# Patient Record
Sex: FEMALE | Race: WHITE | NOT HISPANIC OR LATINO | Employment: OTHER | URBAN - METROPOLITAN AREA
[De-identification: names, ages, dates, MRNs, and addresses within clinical notes are randomized per-mention and may not be internally consistent; named-entity substitution may affect disease eponyms.]

---

## 2019-03-04 PROCEDURE — 88305 TISSUE EXAM BY PATHOLOGIST: CPT | Performed by: PATHOLOGY

## 2019-03-06 ENCOUNTER — LAB REQUISITION (OUTPATIENT)
Dept: LAB | Facility: HOSPITAL | Age: 64
End: 2019-03-06
Payer: MEDICARE

## 2019-03-06 DIAGNOSIS — C44.91 BASAL CELL CARCINOMA OF SKIN: ICD-10-CM

## 2020-01-30 ENCOUNTER — TRANSCRIBE ORDERS (OUTPATIENT)
Dept: ADMINISTRATIVE | Facility: HOSPITAL | Age: 65
End: 2020-01-30

## 2020-01-30 DIAGNOSIS — F17.200 TOBACCO USE DISORDER: Primary | ICD-10-CM

## 2020-09-02 ENCOUNTER — HOSPITAL ENCOUNTER (OUTPATIENT)
Dept: CT IMAGING | Facility: HOSPITAL | Age: 65
Discharge: HOME | End: 2020-09-02
Payer: COMMERCIAL

## 2020-09-02 DIAGNOSIS — F17.200 TOBACCO USE DISORDER: ICD-10-CM

## 2020-09-02 PROCEDURE — G0297 LDCT FOR LUNG CA SCREEN: HCPCS

## 2020-09-25 ENCOUNTER — TRANSCRIBE ORDERS (OUTPATIENT)
Dept: ADMINISTRATIVE | Facility: HOSPITAL | Age: 65
End: 2020-09-25

## 2020-09-25 DIAGNOSIS — R91.1 SOLITARY PULMONARY NODULE: Primary | ICD-10-CM

## 2021-01-27 ENCOUNTER — TRANSCRIBE ORDERS (OUTPATIENT)
Dept: ADMINISTRATIVE | Facility: HOSPITAL | Age: 66
End: 2021-01-27

## 2021-01-27 DIAGNOSIS — R91.8 ABNORMAL CT LUNG SCREENING: Primary | ICD-10-CM

## 2021-03-10 DIAGNOSIS — Z23 ENCOUNTER FOR IMMUNIZATION: ICD-10-CM

## 2021-11-17 ENCOUNTER — HOSPITAL ENCOUNTER (OUTPATIENT)
Dept: CT IMAGING | Facility: HOSPITAL | Age: 66
Discharge: HOME | End: 2021-11-17
Payer: MEDICARE

## 2021-11-17 DIAGNOSIS — R91.8 ABNORMAL CT LUNG SCREENING: ICD-10-CM

## 2021-11-17 PROCEDURE — 71271 CT THORAX LUNG CANCER SCR C-: CPT

## 2022-05-12 ENCOUNTER — CONSULT (OUTPATIENT)
Dept: PAIN MEDICINE | Facility: MEDICAL CENTER | Age: 67
End: 2022-05-12
Payer: MEDICARE

## 2022-05-12 VITALS
HEART RATE: 84 BPM | BODY MASS INDEX: 31.54 KG/M2 | WEIGHT: 178 LBS | HEIGHT: 63 IN | DIASTOLIC BLOOD PRESSURE: 70 MMHG | TEMPERATURE: 98.1 F | OXYGEN SATURATION: 97 % | SYSTOLIC BLOOD PRESSURE: 142 MMHG

## 2022-05-12 DIAGNOSIS — M46.1 SACROILIITIS (HCC): Primary | ICD-10-CM

## 2022-05-12 PROCEDURE — 99204 OFFICE O/P NEW MOD 45 MIN: CPT | Performed by: PHYSICAL MEDICINE & REHABILITATION

## 2022-05-12 RX ORDER — LISINOPRIL 2.5 MG/1
TABLET ORAL
COMMUNITY
Start: 2022-05-06

## 2022-05-12 RX ORDER — TRAMADOL HYDROCHLORIDE 50 MG/1
TABLET ORAL
COMMUNITY
Start: 2022-02-28

## 2022-05-12 RX ORDER — ESCITALOPRAM OXALATE 10 MG/1
10 TABLET ORAL DAILY
COMMUNITY
Start: 2022-03-18

## 2022-05-12 RX ORDER — ROSUVASTATIN CALCIUM 5 MG/1
TABLET, COATED ORAL DAILY
COMMUNITY

## 2022-05-12 NOTE — PATIENT INSTRUCTIONS
Sacroiliitis   WHAT YOU NEED TO KNOW:   Sacroiliitis is a painful swelling of one or both of your sacroiliac joints that lasts at least 3 months  The sacroiliac joint connects your pelvis to the base of your spine  DISCHARGE INSTRUCTIONS:   Medicines:  Ask for more information about these and other medicines you may need to treat sacroiliitis:  Pain medicine: You may be given medicine to take away or decrease pain  Do not wait until the pain is severe before you take your medicine  You may be given the medicine as a pill to swallow or as a lotion that you put on the painful area  NSAIDs  help decrease swelling and pain or fever  This medicine is available with or without a doctor's order  NSAIDs can cause stomach bleeding or kidney problems in certain people  If you take blood thinner medicine, always ask your healthcare provider if NSAIDs are safe for you  Always read the medicine label and follow directions  Muscle relaxers  help decrease pain and muscle spasms  Take your medicine as directed  Contact your healthcare provider if you think your medicine is not helping or if you have side effects  Tell him of her if you are allergic to any medicine  Keep a list of the medicines, vitamins, and herbs you take  Include the amounts, and when and why you take them  Bring the list or the pill bottles to follow-up visits  Carry your medicine list with you in case of an emergency  Physical therapy:  Your healthcare provider may suggest physical therapy  Your physical therapist may teach you exercises to improve posture (the way you stand and sit), flexibility, and strength in your lower back  He may also teach you how to remain safely active and avoid further injury  Follow the exercise plan given to you by your physical therapist   Rest:  Follow your healthcare provider's instructions about how much rest you should get  Avoid activity that worsens your pain    Ice or heat packs:  Use ice or heat packs on the sore area of your body to decrease the pain and swelling  Put ice in a plastic bag covered with a towel on your low back  Cover heated items with a towel to avoid burns  Use ice and heat as directed  Follow up with your healthcare provider or spine specialist within 1 to 2 weeks:  Write down your questions so you remember to ask them during your visits  Contact your healthcare provider or spine specialist if:   Your pain makes it hard for you to do your daily activities, such as work or school  Your pain does not go away after treatment  You feel depressed or anxious  You have questions about your condition or care  Return to the emergency department if:   You have a fever  Your pain is worse than before  Your pain prevents you from sleeping  © Copyright [employer] 2022 Information is for End User's use only and may not be sold, redistributed or otherwise used for commercial purposes  All illustrations and images included in CareNotes® are the copyrighted property of [employer]  or Phil Santiago   The above information is an  only  It is not intended as medical advice for individual conditions or treatments  Talk to your doctor, nurse or pharmacist before following any medical regimen to see if it is safe and effective for you

## 2022-05-12 NOTE — PROGRESS NOTES
Assessment  1  Sacroiliitis ([facility] [address] ) - Right        Plan  1  At this time we will schedule patient for a right sacroiliac joint injection under fluoroscopic guidance  Complete risks and benefits including bleeding, infection, tissue reaction, allergic reaction were discussed  Verbal consent obtained  My impressions and treatment recommendations were discussed in detail with the patient who verbalized understanding and had no further questions  Discharge instructions were provided  I personally saw and examined the patient and I agree with the above discussed plan of care  Orders Placed This Encounter   Procedures    [place] spine and pain procedure     Standing Status:   Future     Standing Expiration Date:   5/12/2023     Order Specific Question:   Reason for Exam:     Answer:   (R) SIJ injection     Order Specific Question:   Anticoagulant hold needed? Answer:   no     New Medications Ordered This Visit   Medications    escitalopram (LEXAPRO) 10 mg tablet     Sig: Take 10 mg by mouth in the morning   lisinopril (ZESTRIL) 2 5 mg tablet    rosuvastatin (CRESTOR) 5 mg tablet     Sig: Take by mouth Daily    traMADol (ULTRAM) 50 mg tablet     Sig: PLEASE SEE ATTACHED FOR DETAILED DIRECTIONS       History of Present Illness    Deborah Canales is a 74 y o  female seen in consultation regarding transfer of care from her previous pain physician, Dr Josh Valladares  She had been seeing him for number of years with response to interventional approaches and medication management  She is currently describing pain in the right sacroiliac region which is moderate in intensity in rated as a 5/10  This is nearly constant without any typical pattern characterized as shooting, sharp, dull, aching  She denies any radicular features at this time and does not require an assistive device for ambulation  Aggravating factors include exercise  Alleviating factors include relaxation      Diagnostic studies include MRI of the lumbar spine at outside institution I do not have these images to review but the report is available  She has noted excellent relief in the past with the injections moderate relief with physical therapy and exercise as well as local heat or ice application and a 81Y unit  Social history positive for tobacco and rare alcohol consumption negative for marijuana use  Previously using tramadol for pain relief  I have personally reviewed and/or updated the patient's past medical history, past surgical history, family history, social history, current medications, allergies, and vital signs today  Review of Systems   Constitutional: Negative for fever and unexpected weight change  HENT: Negative for trouble swallowing  Eyes: Negative for visual disturbance  Respiratory: Positive for cough  Negative for shortness of breath and wheezing  Cardiovascular: Negative for chest pain and palpitations  Gastrointestinal: Negative for constipation, diarrhea, nausea and vomiting  Endocrine: Negative for cold intolerance, heat intolerance and polydipsia  Genitourinary: Negative for difficulty urinating and frequency  Musculoskeletal: Positive for back pain and gait problem  Negative for arthralgias, joint swelling and myalgias  Skin: Negative for rash  Neurological: Negative for dizziness, seizures, syncope, weakness and headaches  Hematological: Does not bruise/bleed easily  Psychiatric/Behavioral: Negative for dysphoric mood  The patient is nervous/anxious  All other systems reviewed and are negative  There is no problem list on file for this patient        Past Medical History:   Diagnosis Date    Anxiety     Arthritis     Diabetes mellitus ([address] )     Hyperlipidemia        Past Surgical History:   Procedure Laterality Date    EPIDURAL BLOCK INJECTION      FRACTURE SURGERY      ORTHOPEDIC SURGERY         Family History   Problem Relation Age of Onset    No Known Problems Mother  No Known Problems Father        Social History     Occupational History    Not on file   Tobacco Use    Smoking status: Current Every Day Smoker     Packs/day: 0 50     Years: 25 00     Pack years: 12 50     Types: Cigarettes    Smokeless tobacco: Former User   Vaping Use    Vaping Use: Never used   Substance and Sexual Activity    Alcohol use: Yes    Drug use: Never    Sexual activity: Yes       Current Outpatient Medications on File Prior to Visit   Medication Sig    escitalopram (LEXAPRO) 10 mg tablet Take 10 mg by mouth in the morning   lisinopril (ZESTRIL) 2 5 mg tablet     rosuvastatin (CRESTOR) 5 mg tablet Take by mouth Daily    traMADol (ULTRAM) 50 mg tablet PLEASE SEE ATTACHED FOR DETAILED DIRECTIONS     No current facility-administered medications on file prior to visit  No Known Allergies    Physical Exam    /70   Pulse 84   Temp 98 1 °F (36 7 °C)   Ht 5' 3" (1 6 m)   Wt 80 7 kg (178 lb)   SpO2 97%   BMI 31 53 kg/m²     LUMBAR  General: Well-developed, well-nourished individual in no acute distress  Mental: Appropriate mood and affect  Grossly oriented with coherent speech and thought processing   Neuro:  Cranial nerves: Cranial nerve function is grossly intact bilaterally   Strength: Bilateral lower extremity strength is normal and symmetric   No atrophy or tone abnormalities noted   Reflexes: Bilateral lower extremity muscle stretch reflexes are absent at the knees and ankles    No ankle clonus is noted   Sensation: No loss of sensation is noted   SLR/Foraminal Compression Maneuvers: Straight leg raising in the sitting and supine position is negative for radicular pain       Sacroiliac joint testing is positive on the right for the following tests:   + Darnell finger sign   + Jordan's test   + Gaenslen's test   + Posterior pelvic pain provocation   + Sacroiliac joint compression test    Gait:  Gait/gross motor: Gait is normal  Station is normal  Toe walking, heel walking  are normal     Musculoskeletal:  Palpation: Inspection and palpation of the spine and extremities are unremarkable   Spine: Normal pain-free range of motion   No gross axial skeletal deformities   Skin: Skin inspection grossly negative for erythema, breakdown, or concerning lesions in affected area   Lymph: No lymphadenopathy is appreciated in the involved extremity   Vessels: No lower extremity edema   Lungs: Breathing is comfortable and regular  No dyspnea noted during examination   Eyes: Visual field grossly intact to confrontation  No redness appreciated  ENT: No craniofacial deformities or asymmetry  No neck masses appreciated  Imaging    MRI LUMBAR SPINE WO CONTRAST      Impression    Impression:   1  Left foraminal disc protrusion at L4-5    2  Central canal stenosis which is moderate at L3-4 and mild at L2-3 and L4-5    3  Narrowing of multiple neural foramina as detailed above  Workstation:[id number]    Narrative    History: Lower back pain  Procedure: Noncontrast MRI of the lumbar spine  Sagittal T1 and STIR, sagittal   and axial T2  Comparison: X-rays of 8/3/2016  Findings: For the purposes of this dictation, the most inferior disc will be   designated L5-S1  Conus and lower thoracic cord: Both normal  Conus medullaris located at T12-L1  Marrow and Alignment: No marrow replacing process  Alteration of the marrow   signal intensity at the L2-3, L3-4, L4-5 and L5-S1 endplates secondary to   degenerative change  Minimal levoscoliosis  Mild retrolisthesis of L2 and L3      T10-11: Loss of disc height  Annular bulge  Small osteophytes  Degenerative   changes of the facet joints  Right neural foraminal stenosis  T11-12: Unremarkable  T12-L1: Loss of T2 signal intensity in the disc, an early sign of degenerative   change  L1-L2: Small left foraminal disc protrusion  Degenerated facet joints with small   facet joint effusions  Moderate left neural foraminal stenosis  L2-L3 : Severe loss of disc height  Annular bulge  Osteophytes  Mild   degeneration of the facet joints  Mild central canal stenosis  L3-L4: Severe loss of disc height  Annular bulge  Osteophytes  Degenerated facet   joints  Abundant posterior epidural fat  Moderate central canal stenosis  Severe   right and moderate left neural foraminal narrowing  L4-L5: Severe loss of disc height  Annular bulge  Superimposed left foraminal   disc protrusion which severely narrows the left neural foramen and contacts the   exiting left L4 nerve root  Osteophytes  Degenerated left facet joint  Mild   central canal stenosis     L5-S1: Severe loss of disc height  Annular bulge  Osteophytes  Degenerated facet   joints, worse on the left  Severe left and moderate right neural foraminal   stenosis  Paraspinal soft tissues: 2 7 cm left renal cyst     Other Result Text    This result has an attachment that is not available  Interface, Rad Results In - 10/31/2019  8:51 AM EDT   History: Lower back pain  Procedure: Noncontrast MRI of the lumbar spine  Sagittal T1 and STIR, sagittal   and axial T2  Comparison: X-rays of 8/3/2016  Findings: For the purposes of this dictation, the most inferior disc will be   designated L5-S1  Conus and lower thoracic cord: Both normal  Conus medullaris located at T12-L1  Marrow and Alignment: No marrow replacing process  Alteration of the marrow   signal intensity at the L2-3, L3-4, L4-5 and L5-S1 endplates secondary to   degenerative change  Minimal levoscoliosis  Mild retrolisthesis of L2 and L3      T10-11: Loss of disc height  Annular bulge  Small osteophytes  Degenerative   changes of the facet joints  Right neural foraminal stenosis  T11-12: Unremarkable  T12-L1: Loss of T2 signal intensity in the disc, an early sign of degenerative   change  L1-L2: Small left foraminal disc protrusion   Degenerated facet joints with small   facet joint effusions  Moderate left neural foraminal stenosis  L2-L3 : Severe loss of disc height  Annular bulge  Osteophytes  Mild   degeneration of the facet joints  Mild central canal stenosis  L3-L4: Severe loss of disc height  Annular bulge  Osteophytes  Degenerated facet   joints  Abundant posterior epidural fat  Moderate central canal stenosis  Severe   right and moderate left neural foraminal narrowing  L4-L5: Severe loss of disc height  Annular bulge  Superimposed left foraminal   disc protrusion which severely narrows the left neural foramen and contacts the   exiting left L4 nerve root  Osteophytes  Degenerated left facet joint  Mild   central canal stenosis     L5-S1: Severe loss of disc height  Annular bulge  Osteophytes  Degenerated facet   joints, worse on the left  Severe left and moderate right neural foraminal   stenosis  Paraspinal soft tissues: 2 7 cm left renal cyst      IMPRESSION:   Impression:   1  Left foraminal disc protrusion at L4-5    2  Central canal stenosis which is moderate at L3-4 and mild at L2-3 and L4-5    3  Narrowing of multiple neural foramina as detailed above               [id number]  Specimen Collected: -- Last Resulted: --   Date: 10/31/19    Received From: [facility]

## 2022-06-14 ENCOUNTER — HOSPITAL ENCOUNTER (OUTPATIENT)
Dept: RADIOLOGY | Facility: MEDICAL CENTER | Age: 67
Discharge: HOME | End: 2022-06-14
Attending: PHYSICAL MEDICINE & REHABILITATION | Admitting: PHYSICAL MEDICINE & REHABILITATION
Payer: MEDICARE

## 2022-06-14 VITALS
TEMPERATURE: 98.4 F | RESPIRATION RATE: 18 BRPM | DIASTOLIC BLOOD PRESSURE: 84 MMHG | HEART RATE: 74 BPM | SYSTOLIC BLOOD PRESSURE: 143 MMHG | OXYGEN SATURATION: 93 %

## 2022-06-14 DIAGNOSIS — M46.1 SACROILIITIS (HCC): ICD-10-CM

## 2022-06-14 PROCEDURE — 27096 INJECT SACROILIAC JOINT: CPT | Performed by: PHYSICAL MEDICINE & REHABILITATION

## 2022-06-14 RX ORDER — METHYLPREDNISOLONE ACETATE 40 MG/ML
40 INJECTION, SUSPENSION INTRA-ARTICULAR; INTRALESIONAL; INTRAMUSCULAR; PARENTERAL; SOFT TISSUE ONCE
Status: COMPLETED | OUTPATIENT
Start: 2022-06-14 | End: 2022-06-14

## 2022-06-14 RX ORDER — BUPIVACAINE HCL/PF 2.5 MG/ML
1.5 VIAL (ML) INJECTION ONCE
Status: COMPLETED | OUTPATIENT
Start: 2022-06-14 | End: 2022-06-14

## 2022-06-14 RX ADMIN — Medication 1.5 ML: at 09:32

## 2022-06-14 RX ADMIN — METHYLPREDNISOLONE ACETATE 40 MG: 40 INJECTION, SUSPENSION INTRA-ARTICULAR; INTRALESIONAL; INTRAMUSCULAR; PARENTERAL; SOFT TISSUE at 09:32

## 2022-06-14 NOTE — H&P
History of Present Illness: The patient is a 74 y o  female who presents with complaints of right low back pain    There is no problem list on file for this patient  Past Medical History:   Diagnosis Date    Anxiety     Arthritis     Diabetes mellitus ([facility] )     Hyperlipidemia        Past Surgical History:   Procedure Laterality Date    EPIDURAL BLOCK INJECTION      FRACTURE SURGERY      ORTHOPEDIC SURGERY           Current Outpatient Medications:     escitalopram (LEXAPRO) 10 mg tablet, Take 10 mg by mouth in the morning , Disp: , Rfl:     lisinopril (ZESTRIL) 2 5 mg tablet, , Disp: , Rfl:     rosuvastatin (CRESTOR) 5 mg tablet, Take by mouth Daily, Disp: , Rfl:     traMADol (ULTRAM) 50 mg tablet, PLEASE SEE ATTACHED FOR DETAILED DIRECTIONS, Disp: , Rfl:     No Known Allergies    Physical Exam:   Vitals:    06/14/22 0916   BP: 135/81   Pulse: 78   Resp: 20   Temp: 98 4 °F (36 9 °C)   SpO2: 93%     General: Awake, Alert, Oriented x 3, Mood and affect appropriate  Respiratory: Respirations even and unlabored  Cardiovascular: Peripheral pulses intact; no edema  Musculoskeletal Exam: right low back pain    ASA Score: 2    Patient/Chart Verification  Patient ID Verified: Verbal  ID Band Applied: No  Consents Confirmed: Procedural, To be obtained in the Pre-Procedure area  H&P( within 30 days) Verified: To be obtained in the Pre-Procedure area  Interval H&P(within 24 hr) Complete (required for Outpatients and Surgery Admit only): To be obtained in the Pre-Procedure area  Allergies Reviewed: Yes  Anticoag/NSAID held?: NA  Currently on antibiotics?: No    Assessment:   1   Sacroiliitis ([facility] ) - Right        Plan: (R) SIJ injection

## 2022-06-14 NOTE — DISCHARGE INSTRUCTIONS
Steroid Joint Injection   WHAT YOU NEED TO KNOW:   A steroid joint injection is a procedure to inject steroid medicine into a joint  Steroid medicine decreases pain and inflammation  The injection may also contain an anesthetic (numbing medicine) to decrease pain  It may be done to treat conditions such as arthritis, gout, or carpal tunnel syndrome  The injections may be given in your knee, ankle, shoulder, elbow, wrist, ankle or sacroiliac joint  Do not apply heat to any area that is numb  If you have discomfort or soreness at the injection site, you may apply ice today, 20 minutes on and 20 minutes off  Tomorrow you may use ice or warm, moist heat  Do not apply ice or heat directly to the skin  You may have an increase or change in the discomfort for 36-48 hours after your treatment  Apply ice and continue with any pain medicine you have been prescribed  Do not do anything strenuous today  You may shower, but no tub baths or hot tubs today  You may resume your normal activities tomorrow, but do not overdo it  Resume normal activities slowly when you are feeling better  If you experience redness, drainage or swelling at the injection site, or if you develop a fever above 100 degrees, please call [facility] at [phone] or go to the Emergency Room  Continue to take all routine medicines prescribed by your primary care physician unless otherwise instructed by our staff  Most blood thinners should be started again according to your regularly scheduled dosing  If you have any questions, please give our office a call  As no general anesthesia was used in today's procedure, you should not experience any side effects related to anesthesia  If you have a problem specifically related to your procedure, please call our office at [phone]  Problems not related to your procedure should be directed to your primary care physician

## 2022-06-21 ENCOUNTER — TELEPHONE (OUTPATIENT)
Dept: PAIN MEDICINE | Facility: CLINIC | Age: 67
End: 2022-06-21

## 2022-06-21 NOTE — TELEPHONE ENCOUNTER
Pt reports 80% improvement post inj  Pain level 2-3/10 she feels its more achy versus pain    Patient would like to speak with a nurse for an explanation on the injection protocol  She's curious about how often she can get the injection

## 2022-06-27 NOTE — TELEPHONE ENCOUNTER
RN s/w pt regarding previous  Pt is very happy with the outcome of the injection and is feeling really good and is aware that this injection can be repeated every three months and if greater than 6 months she will need a follow up in the office first   Pt appreciative of call back

## 2022-11-22 ENCOUNTER — HOSPITAL ENCOUNTER (OUTPATIENT)
Dept: CT IMAGING | Facility: HOSPITAL | Age: 67
Discharge: HOME | End: 2022-11-22

## 2022-11-22 DIAGNOSIS — F17.200 NICOTINE DEPENDENCE, UNSPECIFIED, UNCOMPLICATED: ICD-10-CM

## 2023-11-28 ENCOUNTER — HOSPITAL ENCOUNTER (OUTPATIENT)
Dept: CT IMAGING | Facility: HOSPITAL | Age: 68
Discharge: HOME | End: 2023-11-28
Payer: MEDICARE

## 2023-11-28 DIAGNOSIS — F17.200 NICOTINE DEPENDENCE, UNSPECIFIED, UNCOMPLICATED: ICD-10-CM

## 2023-11-28 PROCEDURE — 71271 CT THORAX LUNG CANCER SCR C-: CPT

## 2024-05-14 PROCEDURE — 88305 TISSUE EXAM BY PATHOLOGIST: CPT | Performed by: PATHOLOGY

## 2024-05-14 PROCEDURE — 88342 IMHCHEM/IMCYTCHM 1ST ANTB: CPT | Performed by: PATHOLOGY

## 2024-05-14 PROCEDURE — 88341 IMHCHEM/IMCYTCHM EA ADD ANTB: CPT | Performed by: PATHOLOGY

## 2024-05-16 ENCOUNTER — LAB REQUISITION (OUTPATIENT)
Dept: LAB | Facility: HOSPITAL | Age: 69
End: 2024-05-16
Payer: MEDICARE

## 2024-05-16 DIAGNOSIS — D48.5 NEOPLASM OF UNCERTAIN BEHAVIOR OF SKIN: ICD-10-CM

## 2024-05-21 PROCEDURE — 88342 IMHCHEM/IMCYTCHM 1ST ANTB: CPT | Performed by: PATHOLOGY

## 2024-05-21 PROCEDURE — 88341 IMHCHEM/IMCYTCHM EA ADD ANTB: CPT | Performed by: PATHOLOGY

## 2024-05-21 PROCEDURE — 88305 TISSUE EXAM BY PATHOLOGIST: CPT | Performed by: PATHOLOGY

## 2024-12-10 ENCOUNTER — HOSPITAL ENCOUNTER (OUTPATIENT)
Dept: CT IMAGING | Facility: HOSPITAL | Age: 69
Discharge: HOME | End: 2024-12-10

## 2024-12-10 DIAGNOSIS — F17.200 SMOKING: ICD-10-CM
